# Patient Record
Sex: FEMALE | Race: WHITE
[De-identification: names, ages, dates, MRNs, and addresses within clinical notes are randomized per-mention and may not be internally consistent; named-entity substitution may affect disease eponyms.]

---

## 2020-01-22 ENCOUNTER — HOSPITAL ENCOUNTER (OUTPATIENT)
Dept: HOSPITAL 47 - RADUSWWP | Age: 65
Discharge: HOME | End: 2020-01-22
Attending: FAMILY MEDICINE
Payer: COMMERCIAL

## 2020-01-22 DIAGNOSIS — I10: ICD-10-CM

## 2020-01-22 DIAGNOSIS — M76.891: ICD-10-CM

## 2020-01-22 DIAGNOSIS — K42.9: Primary | ICD-10-CM

## 2020-01-22 DIAGNOSIS — E78.5: ICD-10-CM

## 2020-01-22 LAB
ALBUMIN SERPL-MCNC: 4.3 G/DL (ref 3.5–5)
ALP SERPL-CCNC: 66 U/L (ref 38–126)
ALT SERPL-CCNC: 29 U/L (ref 4–34)
ANION GAP SERPL CALC-SCNC: 10 MMOL/L
AST SERPL-CCNC: 37 U/L (ref 14–36)
BASOPHILS # BLD AUTO: 0 K/UL (ref 0–0.2)
BASOPHILS NFR BLD AUTO: 1 %
BUN SERPL-SCNC: 15 MG/DL (ref 7–17)
CALCIUM SPEC-MCNC: 9.8 MG/DL (ref 8.4–10.2)
CHLORIDE SERPL-SCNC: 101 MMOL/L (ref 98–107)
CHOLEST SERPL-MCNC: 255 MG/DL (ref ?–200)
CO2 SERPL-SCNC: 26 MMOL/L (ref 22–30)
EOSINOPHIL # BLD AUTO: 0.2 K/UL (ref 0–0.7)
EOSINOPHIL NFR BLD AUTO: 3 %
ERYTHROCYTE [DISTWIDTH] IN BLOOD BY AUTOMATED COUNT: 4.87 M/UL (ref 3.8–5.4)
ERYTHROCYTE [DISTWIDTH] IN BLOOD: 12.6 % (ref 11.5–15.5)
GLUCOSE SERPL-MCNC: 292 MG/DL (ref 74–99)
HCT VFR BLD AUTO: 44.2 % (ref 34–46)
HDLC SERPL-MCNC: 59 MG/DL (ref 40–60)
HGB BLD-MCNC: 14.9 GM/DL (ref 11.4–16)
LDLC SERPL CALC-MCNC: 166 MG/DL (ref 0–99)
LYMPHOCYTES # SPEC AUTO: 1.3 K/UL (ref 1–4.8)
LYMPHOCYTES NFR SPEC AUTO: 23 %
MCH RBC QN AUTO: 30.5 PG (ref 25–35)
MCHC RBC AUTO-ENTMCNC: 33.6 G/DL (ref 31–37)
MCV RBC AUTO: 90.7 FL (ref 80–100)
MONOCYTES # BLD AUTO: 0.3 K/UL (ref 0–1)
MONOCYTES NFR BLD AUTO: 5 %
NEUTROPHILS # BLD AUTO: 3.7 K/UL (ref 1.3–7.7)
NEUTROPHILS NFR BLD AUTO: 66 %
PLATELET # BLD AUTO: 288 K/UL (ref 150–450)
POTASSIUM SERPL-SCNC: 4.4 MMOL/L (ref 3.5–5.1)
PROT SERPL-MCNC: 7.4 G/DL (ref 6.3–8.2)
SODIUM SERPL-SCNC: 137 MMOL/L (ref 137–145)
TRIGL SERPL-MCNC: 149 MG/DL (ref ?–150)
WBC # BLD AUTO: 5.6 K/UL (ref 3.8–10.6)

## 2020-01-22 PROCEDURE — 80061 LIPID PANEL: CPT

## 2020-01-22 PROCEDURE — 82306 VITAMIN D 25 HYDROXY: CPT

## 2020-01-22 PROCEDURE — 80053 COMPREHEN METABOLIC PANEL: CPT

## 2020-01-22 PROCEDURE — 76705 ECHO EXAM OF ABDOMEN: CPT

## 2020-01-22 PROCEDURE — 85025 COMPLETE CBC W/AUTO DIFF WBC: CPT

## 2020-01-22 NOTE — US
EXAMINATION TYPE: US abdomen limited

 

DATE OF EXAM: 1/22/2020

 

COMPARISON: NONE

 

CLINICAL HISTORY: K42.9 HERNIA. Umbilical hernia

 

Assess for hernia at location of:  umbilicus

 

2.5 x 1.6 x 2.7cm superficial area with peristalsing seen at patient's area of concern 

 

Unable to do valsalva - language barrier. 

 

IMPRESSION:  2.7 cm area of abnormal attenuation with suggestion of possible peristalsis and hernia. 
Recommend CT scan for confirmation.

 

Real-time scanning was performed by the sonographer utilizing Valsalva and additional dynamic maneuve
rs to assess for hernia.

 

Images of the contralateral side were also acquired for direct comparison.

## 2020-01-22 NOTE — XR
EXAMINATION TYPE: XR knee complete RT

 

DATE OF EXAM: 1/22/2020

 

CLINICAL HISTORY: Pain for 3 weeks.

 

TECHNIQUE:  Three views of the right knee are obtained.

 

COMPARISON: None.

 

FINDINGS:  There is no acute fracture/dislocation evident in right knee. Moderate narrowing and spurr
ing patellofemoral compartment. Mild to moderate narrowing and mild spurring medial tibiofemoral comp
artment. Increased density suprapatellar bursa may reflect small to moderate size joint effusion. Pos
terior fabella is present.

 

IMPRESSION: As above.

## 2020-01-31 ENCOUNTER — HOSPITAL ENCOUNTER (EMERGENCY)
Dept: HOSPITAL 47 - EC | Age: 65
Discharge: HOME | End: 2020-01-31
Payer: COMMERCIAL

## 2020-01-31 VITALS — HEART RATE: 81 BPM | RESPIRATION RATE: 18 BRPM | SYSTOLIC BLOOD PRESSURE: 121 MMHG | DIASTOLIC BLOOD PRESSURE: 74 MMHG

## 2020-01-31 VITALS — TEMPERATURE: 98.3 F

## 2020-01-31 DIAGNOSIS — Z79.84: ICD-10-CM

## 2020-01-31 DIAGNOSIS — E11.65: Primary | ICD-10-CM

## 2020-01-31 LAB
GLUCOSE BLD-MCNC: 263 MG/DL (ref 75–99)
GLUCOSE BLD-MCNC: 310 MG/DL (ref 75–99)

## 2020-01-31 PROCEDURE — 99284 EMERGENCY DEPT VISIT MOD MDM: CPT

## 2020-01-31 PROCEDURE — 36415 COLL VENOUS BLD VENIPUNCTURE: CPT

## 2020-01-31 NOTE — ED
General Adult HPI





- General


Chief complaint: Recheck/Abnormal Lab/Rx


Stated complaint: High sugar


Time Seen by Provider: 01/31/20 14:22


Source: patient, family, RN notes reviewed


Mode of arrival: ambulatory


Limitations: language barrier





- History of Present Illness


Initial comments: 





64-year-old female presents emergency Department with chief complaint of high 

blood sugar.  Patient states that she was started on metformin yesterday once a 

day and is increased to twice a day after one week.  They started her on a 

glucometer and states that they will check this morning was 200 and checked this

afternoon and it was 500.  Patient states that she is some Cheerios and drink 

coffee with Frenchman creamer.  Patient has been set up with the diabetic educa

tion class they did go over some simple food restrictions.  Patient has no 

specific complaints denies headache, dizziness, blurred vision, nausea, vomiting

diarrhea.  Patient states that she has no complaints other that blood sugar was 

elevated and the nurse at PCPs office advised them to go to the emergency 

department.





- Related Data


                                  Previous Rx's











 Medication  Instructions  Recorded


 


Ciprofloxacin HCl [Cipro] 500 mg PO BID #14 tablet 08/30/15


 


Ciprofloxacin HCl/Dexameth 4 drop RIGHT EAR BID #1 bottle 08/30/15





[Ciprodex Otic Susp]  











                                    Allergies











Allergy/AdvReac Type Severity Reaction Status Date / Time


 


No Known Allergies Allergy   Verified 01/31/20 14:18














Review of Systems


ROS Statement: 


Those systems with pertinent positive or pertinent negative responses have been 

documented in the HPI.





ROS Other: All systems not noted in ROS Statement are negative.





Past Medical History


Past Medical History: Diabetes Mellitus, Hyperlipidemia, Hypertension


History of Any Multi-Drug Resistant Organisms: None Reported


Past Surgical History: No Surgical Hx Reported


Past Psychological History: No Psychological Hx Reported


Smoking Status: Never smoker


Past Alcohol Use History: None Reported


Past Drug Use History: None Reported, Unable to Obtain





General Exam


Limitations: language barrier


General appearance: alert, in no apparent distress


Head exam: Present: atraumatic, normocephalic, normal inspection


Eye exam: Present: normal appearance, PERRL, EOMI.  Absent: scleral icterus, 

conjunctival injection, periorbital swelling


ENT exam: Present: normal exam, normal oropharynx, mucous membranes moist, TM's 

normal bilaterally


Neck exam: Present: normal inspection, full ROM.  Absent: tenderness, menin

gismus, lymphadenopathy


Respiratory exam: Present: normal lung sounds bilaterally.  Absent: respiratory 

distress, wheezes, rales, rhonchi, stridor


Cardiovascular Exam: Present: regular rate, normal rhythm, normal heart sounds. 

Absent: systolic murmur, diastolic murmur, rubs, gallop, clicks


Neurological exam: Present: alert


Skin exam: Present: warm, dry, intact, normal color.  Absent: rash





Course


                                   Vital Signs











  01/31/20





  14:10


 


Temperature 98.3 F


 


Pulse Rate 77


 


Respiratory 20





Rate 


 


Blood Pressure 152/89


 


O2 Sat by Pulse 96





Oximetry 














Medical Decision Making





- Medical Decision Making





64-year-old presented for hyperglycemia.  Patient's blood sugar was elevated 

secondary to dietary choices.  Patient was given 5 units of insulin but sugar 

has improved.  Patient was given metformin will be discharged and she is to 

follow-up with PCP and follow-up with her diabetic education class.





- Lab Data


                                   Lab Results











  01/31/20 Range/Units





  14:24 


 


POC Glucose (mg/dL)  310 H  (75-99)  mg/dL


 


POC Glu Operater ID  Marylin Snell  














Disposition


Clinical Impression: 


 Diabetes, Hyperglycemia





Disposition: HOME SELF-CARE


Condition: Stable


Instructions (If sedation given, give patient instructions):  Basic Carbohydrate

Counting (DC)


Additional Instructions: 


Please return to the Emergency Department if symptoms worsen or any other 

concerns.


Is patient prescribed a controlled substance at d/c from ED?: No


Referrals: 


Dillon Francis [Primary Care Provider] - 1-2 days


Time of Disposition: 15:20

## 2020-02-19 ENCOUNTER — HOSPITAL ENCOUNTER (OUTPATIENT)
Dept: HOSPITAL 47 - OR | Age: 65
Discharge: HOME | End: 2020-02-19
Attending: SURGERY
Payer: COMMERCIAL

## 2020-02-19 VITALS — RESPIRATION RATE: 18 BRPM

## 2020-02-19 VITALS — SYSTOLIC BLOOD PRESSURE: 114 MMHG | DIASTOLIC BLOOD PRESSURE: 74 MMHG | HEART RATE: 93 BPM

## 2020-02-19 VITALS — TEMPERATURE: 98.6 F

## 2020-02-19 DIAGNOSIS — E11.9: ICD-10-CM

## 2020-02-19 DIAGNOSIS — Z79.84: ICD-10-CM

## 2020-02-19 DIAGNOSIS — K42.0: Primary | ICD-10-CM

## 2020-02-19 DIAGNOSIS — Z79.899: ICD-10-CM

## 2020-02-19 DIAGNOSIS — I10: ICD-10-CM

## 2020-02-19 DIAGNOSIS — E66.01: ICD-10-CM

## 2020-02-19 DIAGNOSIS — E78.5: ICD-10-CM

## 2020-02-19 LAB
GLUCOSE BLD-MCNC: 148 MG/DL (ref 75–99)
GLUCOSE BLD-MCNC: 178 MG/DL (ref 75–99)

## 2020-02-19 PROCEDURE — 64488 TAP BLOCK BI INJECTION: CPT

## 2020-02-19 PROCEDURE — 88302 TISSUE EXAM BY PATHOLOGIST: CPT

## 2020-02-19 PROCEDURE — 49653: CPT

## 2020-02-19 RX ADMIN — HYDROMORPHONE HYDROCHLORIDE PRN MG: 1 INJECTION, SOLUTION INTRAMUSCULAR; INTRAVENOUS; SUBCUTANEOUS at 12:25

## 2020-02-19 RX ADMIN — ONDANSETRON PRN MG: 2 INJECTION INTRAMUSCULAR; INTRAVENOUS at 12:12

## 2020-02-19 RX ADMIN — HYDROMORPHONE HYDROCHLORIDE PRN MG: 1 INJECTION, SOLUTION INTRAMUSCULAR; INTRAVENOUS; SUBCUTANEOUS at 12:12

## 2020-02-19 RX ADMIN — ONDANSETRON PRN MG: 2 INJECTION INTRAMUSCULAR; INTRAVENOUS at 09:27

## 2020-02-19 NOTE — P.ANPRN
Procedure Note - Anesthesia





- Nerve Block Performed


  ** Bilateral Rectus Abdominis Single


Time Out Performed: Yes


Date of Procedure: 02/19/20


Procedure Start Time: 09:44


Procedure Stop Time: 09:54


Location of Patient: PreOp


Indication: Acute Post-Operative Pain, Requested by Surgeon


Sedation Type: Sedate with meaningful contact maintained


Preparation: Sterile Prep


Position: Supine


Catheter: None


Needle Types: Pajunk


Needle Gauge: 21


Ultrasound used to visualize needle placement: Yes


Ultrasound used to observe medication spread: Yes


Injectate: 0.5% Ropivacaine (see comment for volume) (ropivacaine 0.5% 20cc + 

decadron 4mg-- per side)


Blood Aspirated: No


Pain Paresthesia on Injection Noted: No


Resistance on Injection: Normal


Image Stored and Saved: Yes


Events: Uneventful and Well Tolerated

## 2020-02-19 NOTE — P.OP
Date of Procedure: 02/19/20


Preoperative Diagnosis: 


Incarcerated umbilical hernia


Postoperative Diagnosis: 


Incarcerated umbilical hernia


Procedure(s) Performed: 


Laparoscopic repair of incarcerated umbilical hernia





Partial omentectomy


Anesthesia: SAEID


Surgeon: Aurelio Sharif


Pathology: other (Omentum)


Condition: stable


Disposition: PACU


Description of Procedure: 


The patient was placed on the operating table in the supine position.  He 

received general anesthesia.  His abdomen was prepped and draped usual fashion. 

Using a 5 mm optical trocar under direct visualization the peritoneal cavity was

entered in the left upper quadrant.  The abdomen was then insufflated.  The 

laparoscope was placed back into the perineal cavity.  Next a 8 mm robotic 

trocar was placed in the left lower quadrant and a 12 mm robotic trocar was 

placed in the left lateral position.  The original 5 mm trocar was exchanged for

a 8 mm robotic trocar.  The patient's placed in the left side up position.  And 

the patient was undocked the robot.





The umbilical hernia was visualized.  Using hook cautery the peritoneum over the

umbilical hernia was excised.  Incarcerated omentum was dissected free and sent 

to pathology The fascial opening was repaired using 0V LOC suture.  Next a piece

of 11 cm round ventral light ST mesh was placed into the.  Cavity and secured 

with 2 OV lock suture.





The patient was undocked the robot.  The needles were retrieved.  The fascia of 

the 12 mm trocar site was closed with 0 Ethibond suture.  Skin was closed int

errupted 3-0 Monocryl suture.  Dermabond dressings was applied.  Patient top 

procedure well and was sent to recovery room stable condition.

## 2020-02-19 NOTE — P.GSHP
History of Present Illness


H&P Date: 02/19/20


Chief Complaint: Incarcerated umbilical hernia





This a 64-year-old female who presents today for laparoscopic robotic repair of 

incarcerated umbilical hernia.  Patient's developed a mass at her umbilicus.





Past Medical History


Past Medical History: Diabetes Mellitus, Hyperlipidemia, Hypertension


History of Any Multi-Drug Resistant Organisms: None Reported


Past Surgical History: No Surgical Hx Reported


Past Psychological History: No Psychological Hx Reported


Smoking Status: Never smoker


Past Alcohol Use History: None Reported


Past Drug Use History: None Reported, Unable to Obtain





Medications and Allergies


                                Home Medications











 Medication  Instructions  Recorded  Confirmed  Type


 


Ergocalciferol (Vitamin D2) 1 tab PO DAILY 02/19/20 02/19/20 History





[Vitamin D2]    


 


Lisinopril 20 mg PO DAILY 02/19/20 02/19/20 History


 


Simvastatin [Zocor] 20 mg PO HS 02/19/20 02/19/20 History


 


glipiZIDE [Glucotrol] 5 mg PO BID 02/19/20 02/19/20 History


 


metFORMIN HCL [Glucophage] 1 tab PO DAILY 02/19/20 02/19/20 History








                                    Allergies











Allergy/AdvReac Type Severity Reaction Status Date / Time


 


No Known Allergies Allergy   Verified 02/14/20 15:26














Surgical - Exam


                                   Vital Signs











Temp Pulse Resp BP Pulse Ox


 


 97.4 F L  67   16   168/75   95 


 


 02/19/20 09:03  02/19/20 09:03  02/19/20 09:03  02/19/20 09:03  02/19/20 09:03














- General


well developed, well nourished, no distress





- Eyes


PERRL





- ENT


normal pinna





- Neck


no masses





- Respiratory


normal expansion





- Cardiovascular


Rhythm: regular





- Abdomen


Abdomen: soft, non tender


Hernia: umbilical (3 cm pressure umbilical hernia)





Assessment and Plan


Assessment: 





Cursory umbilical hernia.  We'll perform laparoscopic robotic-assisted repair.

## 2021-07-13 ENCOUNTER — HOSPITAL ENCOUNTER (EMERGENCY)
Dept: HOSPITAL 47 - EC | Age: 66
Discharge: HOME | End: 2021-07-13
Payer: COMMERCIAL

## 2021-07-13 VITALS
HEART RATE: 64 BPM | TEMPERATURE: 97.3 F | DIASTOLIC BLOOD PRESSURE: 79 MMHG | SYSTOLIC BLOOD PRESSURE: 158 MMHG | RESPIRATION RATE: 18 BRPM

## 2021-07-13 DIAGNOSIS — Z20.822: Primary | ICD-10-CM

## 2021-07-13 PROCEDURE — 99282 EMERGENCY DEPT VISIT SF MDM: CPT

## 2021-07-13 PROCEDURE — 87635 SARS-COV-2 COVID-19 AMP PRB: CPT

## 2021-07-13 NOTE — ED
General Adult HPI





- General


Stated complaint: needs Covid test


Time Seen by Provider: 07/13/21 10:30


Source: patient, family, RN notes reviewed


Mode of arrival: ambulatory


Limitations: no limitations





- History of Present Illness


Initial comments: 





This a 66-year-old female presents emergency department for COVID-19 testing.  

Patient is asymptomatic denies fevers cough congestion sore throat nausea 

vomiting diarrhea constipation.  She only needs a tests because they are 

traveling to [place].  Patient offers no other complaints.





- Related Data


                                    Allergies











Allergy/AdvReac Type Severity Reaction Status Date / Time


 


No Known Allergies Allergy   Verified 07/13/21 10:32














Review of Systems


ROS Statement: 


Those systems with pertinent positive or pertinent negative responses have been 

documented in the HPI.





ROS Other: All systems not noted in ROS Statement are negative.





Past Medical History


Past Medical History: No Reported History


History of Any Multi-Drug Resistant Organisms: None Reported


Past Surgical History: Hernia Repair


Past Psychological History: No Psychological Hx Reported


Past Alcohol Use History: None Reported


Past Drug Use History: None Reported





General Exam


Limitations: no limitations


General appearance: alert, in no apparent distress


Head exam: Present: atraumatic, normocephalic, normal inspection


Eye exam: Present: normal appearance, PERRL, EOMI.  Absent: scleral icterus, 

conjunctival injection, periorbital swelling


Respiratory exam: Present: normal lung sounds bilaterally.  Absent: respiratory 

distress, wheezes, rales, rhonchi, stridor


Cardiovascular Exam: Present: regular rate, normal rhythm, normal heart sounds. 

Absent: systolic murmur, diastolic murmur, rubs, gallop, clicks





Course


                                   Vital Signs











  07/13/21





  10:29


 


Temperature 97.3 F L


 


Pulse Rate 64


 


Respiratory 18





Rate 


 


Blood Pressure 158/79


 


O2 Sat by Pulse 98





Oximetry 














Medical Decision Making





- Medical Decision Making





COVID-19 testing is negative.





- Lab Data


                                   Lab Results











  07/13/21 Range/Units





  10:42 


 


Coronavirus (PCR)  Not Detected  (Not Detectd)  














Disposition


Clinical Impression: 


 Encounter for laboratory testing for COVID-19 virus





Disposition: HOME SELF-CARE


Condition: Stable


Additional Instructions: 


 COVID-19 test is negative Please return to the Emergency Department if symptoms

worsen or any other concerns.


Is patient prescribed a controlled substance at d/c from ED?: No


Referrals: 


Dillon Francis [Primary Care Provider] - 1-2 days


Time of Disposition: 11:28

## 2021-09-15 ENCOUNTER — HOSPITAL ENCOUNTER (OUTPATIENT)
Dept: HOSPITAL 47 - RADXRMAIN | Age: 66
Discharge: HOME | End: 2021-09-15
Attending: FAMILY MEDICINE
Payer: COMMERCIAL

## 2021-09-15 DIAGNOSIS — M51.37: Primary | ICD-10-CM

## 2021-09-15 DIAGNOSIS — M47.816: ICD-10-CM

## 2021-09-15 PROCEDURE — 72100 X-RAY EXAM L-S SPINE 2/3 VWS: CPT

## 2021-09-16 NOTE — XR
EXAMINATION TYPE: XR lumbar spine 2 or 3V

 

DATE OF EXAM: 9/15/2021

 

CLINICAL HISTORY: Low back pain

 

TECHNIQUE: Frontal and lateral images of the lumbar spine are obtained.

 

COMPARISON: None

 

FINDINGS:  There are 5 lumbar type vertebral bodies identified.  The lumbar spine shows straightened 
alignment without evidence of acute fracture or dislocation. Vertebral body heights are maintained. M
oderate to severe disc space narrowing L5-S1 level. Multilevel facet arthropathy. Mild multilevel ant
erior lateral spurring. Moderate calcification of overlying abdominal aorta.

 

IMPRESSION: As above. English

## 2021-10-08 ENCOUNTER — HOSPITAL ENCOUNTER (EMERGENCY)
Dept: HOSPITAL 47 - EC | Age: 66
Discharge: HOME | End: 2021-10-08
Payer: COMMERCIAL

## 2021-10-08 VITALS — DIASTOLIC BLOOD PRESSURE: 89 MMHG | TEMPERATURE: 98.5 F | SYSTOLIC BLOOD PRESSURE: 165 MMHG | HEART RATE: 82 BPM

## 2021-10-08 VITALS — RESPIRATION RATE: 20 BRPM

## 2021-10-08 DIAGNOSIS — H70.91: ICD-10-CM

## 2021-10-08 DIAGNOSIS — E78.5: ICD-10-CM

## 2021-10-08 DIAGNOSIS — H60.91: Primary | ICD-10-CM

## 2021-10-08 DIAGNOSIS — Z79.84: ICD-10-CM

## 2021-10-08 DIAGNOSIS — E11.9: ICD-10-CM

## 2021-10-08 DIAGNOSIS — Z79.899: ICD-10-CM

## 2021-10-08 DIAGNOSIS — I10: ICD-10-CM

## 2021-10-08 DIAGNOSIS — H66.91: ICD-10-CM

## 2021-10-08 PROCEDURE — 70486 CT MAXILLOFACIAL W/O DYE: CPT

## 2021-10-08 PROCEDURE — 99283 EMERGENCY DEPT VISIT LOW MDM: CPT

## 2021-10-08 PROCEDURE — 96372 THER/PROPH/DIAG INJ SC/IM: CPT

## 2021-10-08 NOTE — CT
EXAMINATION TYPE: CT mastoid wo con

 

DATE OF EXAM: 10/8/2021

 

COMPARISON: None

 

HISTORY: right ear pain, dizziness

 

CT DLP: 324.9 mGycm.  Automated Exposure Control for Dose Reduction was Utilized.

 

TECHNIQUE: CT scan of internal auditory canal is performed without contrast, thin cut axial images ar
e obtained, coronal reformatted images are also reviewed.  

 

FINDINGS: There is asymmetric thickening of the right external auditory canal and tympanic membrane. 
There is soft tissue density in right Prussak's space. There is opacification of numerous mastoid air
 cells on the right without evidence of bony dehiscence.

 

The middle ear ossicles are symmetric and unremarkable.  The scutum is preserved bilaterally.  The co
chlea and the semicircular canals are symmetric and unremarkable.  Vestibular aqueduct and internal c
arotid canal appear unremarkable.  

 

Temporomandibular joints are maintained bilaterally.  Visualized paranasal sinuses are grossly clear.
 Visualized portion brain parenchyma is felt within normal limits.

 

IMPRESSION: 

1.  Asymmetric thickening of the right external auditory canal and tympanic membrane.

2.  There is opacification of numerous mastoid air cells on the right without evidence of bony dehisc
ence.

3.  Minimal soft tissue density is seen within right Prussak's space without scutum or ossicular eros
ion. Findings could relate to a cholesteatoma.

## 2021-10-08 NOTE — ED
General Adult HPI





- General


Chief complaint: ENT


Stated complaint: ear pain


Time Seen by Provider: 10/08/21 20:06


Source: patient


Mode of arrival: ambulatory


Limitations: no limitations





- History of Present Illness


Initial comments: 


66-year-old female patient presents to the emergency department today for 

evaluation of right ear pain.  She's been having pain for the last few days and 

was started on amoxicillin by her primary care physician.  Patient states pain 

seems to be worsening.  She denies fever or chills.  States it is causing her to

have a headache.  She does have a history of diabetes.  Denies any drainage from

the ear.  Denies any nasal congestion or cough.








- Related Data


                                Home Medications











 Medication  Instructions  Recorded  Confirmed


 


Ergocalciferol (Vitamin D2) 1 tab PO DAILY 02/19/20 03/11/20





[Vitamin D2]   


 


Simvastatin [Zocor] 20 mg PO HS 02/19/20 03/11/20


 


glipiZIDE [Glucotrol] 5 mg PO BID 02/19/20 03/11/20


 


lisinopriL 20 mg PO DAILY 02/19/20 03/11/20


 


metFORMIN HCL [Glucophage] 1 tab PO DAILY 02/19/20 03/11/20








                                  Previous Rx's











 Medication  Instructions  Recorded


 


Docusate [Colace] 100 mg PO BID #20 capsule 02/19/20


 


HYDROcodone/APAP 5-325MG [Norco 1 tab PO Q6HR PRN #10 tab 02/19/20





5-325]  


 


Acetaminophen-Codeine 300-30mg 1 tab PO Q6H PRN #12 tablet 10/08/21





[Tylenol #3]  


 


Amoxic-Pot Clav 875-125Mg 1 tab PO Q12HR #20 tablet 10/08/21





[Augmentin 875-125]  


 


Amoxic-Pot Clav 875-125Mg 1 tab PO Q12HR #20 tablet 10/08/21





[Augmentin 875-125]  











                                    Allergies











Allergy/AdvReac Type Severity Reaction Status Date / Time


 


No Known Allergies Allergy   Verified 10/08/21 18:56














Review of Systems


ROS Statement: 


Those systems with pertinent positive or pertinent negative responses have been 

documented in the HPI.





ROS Other: All systems not noted in ROS Statement are negative.





Past Medical History


Past Medical History: Diabetes Mellitus, Hyperlipidemia, Hypertension, No 

Reported History


Additional Past Medical History / Comment(s): right knee pain, vitamin D 

deficiency


History of Any Multi-Drug Resistant Organisms: None Reported


Past Surgical History: Hernia Repair, No Surgical Hx Reported


Additional Past Surgical History / Comment(s): umbilical hernia


Past Psychological History: No Psychological Hx Reported


Smoking Status: Never smoker


Past Drug Use History: None Reported, Unable to Obtain





General Exam


Limitations: language barrier


General appearance: alert, in no apparent distress, other (This is a well-

developed, well-nourished adult female patient in no acute distress.)


ENT exam: Present: normal oropharynx, other (Right mastoid tenderness).  Absent:

TM's normal bilaterally (Right tympanic membrane is bulging), normal external 

ear exam (There is soft tissue swelling noted to the right external ear, 

tenderness over the tragus and pinna.)


Neck exam: Present: normal inspection.  Absent: tenderness, meningismus, 

lymphadenopathy


Respiratory exam: Present: normal lung sounds bilaterally.  Absent: respiratory 

distress, wheezes, rales, rhonchi, stridor


Cardiovascular Exam: Present: regular rate, normal rhythm, normal heart sounds. 

Absent: systolic murmur, diastolic murmur, rubs, gallop, clicks


Neurological exam: Present: alert, oriented X3, CN II-XII intact


Psychiatric exam: Present: normal affect, normal mood


Skin exam: Present: warm, dry, intact, normal color.  Absent: rash





Course


                                   Vital Signs











  10/08/21 10/08/21





  18:54 22:37


 


Temperature 98.9 F 98.5 F


 


Pulse Rate 85 82


 


Respiratory 20 20





Rate  


 


Blood Pressure 182/102 165/89


 


O2 Sat by Pulse 97 98





Oximetry  














Medical Decision Making





- Medical Decision Making


66 year old female patient presented to the emergency department today for 

evaluation of increasing right ear pain despite being on antibiotics for the 

last few days.  Physical examination did reveal soft tissue swelling to the 

right external ear and the right external auditory canal.  She also had bulging 

tympanic membrane right mastoid tenderness.  CT was obtained and did reveal 

opacification of some mastoid air cells.  There was thickening of the right 

external auditory canal and tympanic membrane.  Patient was switched from 

amoxicillin to Augmentin.  She is given Ciprodex drops.  Given pain medication. 

She'll be discharged to follow-up with ENT specialty.  Return parameters were 

discussed in detail.  Patient and son verbalizes understanding and agree with 

this plan.  Case discussed with my attending Dr. Mckenzie.





- Radiology Data


Radiology results: report reviewed, image reviewed


CT mastoid without contrast was obtained.  Report was reviewed in its entirety. 

Impression by Dr. Jaramillo shows asymmetric thickening of the right external 

auditory canal and tympanic membrane.  Opacification numerous mastoid air cells 

on the right without evidence of bony dehiscence.  Minimal soft tissue density 

seen within) 6 out scutum ossicular erosion.  Findings could relate to a 

cholesteatoma.





Disposition


Clinical Impression: 


 Right otitis externa, Right otitis media, Mastoiditis of right side





Disposition: HOME SELF-CARE


Condition: Good


Instructions (If sedation given, give patient instructions):  Otitis Externa 

(ED), Ear Infection (ED), Mastoiditis (ED)


Additional Instructions: 


Take medications as directed. Do ear drops 4 drops to the right ear twice daily.

Complete antibiotic prescription in full. Take pain medication sparingly for 

severe pain. Follow up with ENT specialist for further evaluation as soon as 

possible. Return for any new, worsening, or concerning symptoms. 


Prescriptions: 


Amoxic-Pot Clav 875-125Mg [Augmentin 875-125] 1 tab PO Q12HR #20 tablet


Amoxic-Pot Clav 875-125Mg [Augmentin 875-125] 1 tab PO Q12HR #20 tablet


Acetaminophen-Codeine 300-30mg [Tylenol #3] 1 tab PO Q6H PRN #12 tablet


 PRN Reason: Pain


Is patient prescribed a controlled substance at d/c from ED?: Yes


When asked, does pt state using other controlled substances?: No


If prescribed controlled substance>3 days was MAPS reviewed?: Prescribed <3 Days


If opioid is for acute pain is fill amount 7 days or less?: Yes


If Rx opioid, was Start Talking consent form obtained?: Yes


Referrals: 


Dillon Francis [Primary Care Provider] - 1-2 days


Patrice Yen MD [STAFF PHYSICIAN] - 1-2 days


Time of Disposition: 22:23